# Patient Record
Sex: FEMALE | Race: BLACK OR AFRICAN AMERICAN | NOT HISPANIC OR LATINO | URBAN - METROPOLITAN AREA
[De-identification: names, ages, dates, MRNs, and addresses within clinical notes are randomized per-mention and may not be internally consistent; named-entity substitution may affect disease eponyms.]

---

## 2019-01-21 ENCOUNTER — OFFICE (OUTPATIENT)
Dept: URBAN - METROPOLITAN AREA CLINIC 11 | Facility: CLINIC | Age: 74
End: 2019-01-21

## 2019-01-21 VITALS
HEART RATE: 63 BPM | DIASTOLIC BLOOD PRESSURE: 93 MMHG | SYSTOLIC BLOOD PRESSURE: 174 MMHG | WEIGHT: 243 LBS | HEIGHT: 65 IN

## 2019-01-21 DIAGNOSIS — R19.4 CHANGE IN BOWEL HABIT: ICD-10-CM

## 2019-01-21 DIAGNOSIS — R15.1 FECAL SMEARING: ICD-10-CM

## 2019-01-21 PROCEDURE — 99203 OFFICE O/P NEW LOW 30 MIN: CPT | Performed by: INTERNAL MEDICINE

## 2019-01-21 RX ORDER — DICYCLOMINE HYDROCHLORIDE 20 MG/1
TABLET ORAL
Qty: 60 | Refills: 6 | Status: COMPLETED
Start: 2019-01-21 | End: 2019-02-18

## 2019-01-21 NOTE — SERVICENOTES
Will try dicyclomine twice daily due to improve the consistency of the stool.  If not then we will add Metamucil capsules 2-4 daily.

## 2019-01-21 NOTE — SERVICEHPINOTES
73-year-old  female well known to me and is an RN working as a school nurse currently.  She has a history of gallbladder surgery a least 10 years ago and has some difficulty with bowel function.  She does not really have diarrhea but has stools with poor consistency and some sticky and orally stool.  She has seepage that bothers her but no urgency and no real diarrhea.  She is lactose intolerant and avoids that in her diet.  She has been taking dicyclomine 20 milligrams once a day only.  Her last colonoscopy was in 2013 with diverticulosis only.  She has had a mild weight loss.  There has been no bleeding and no melena.

## 2023-09-11 ENCOUNTER — OFFICE (OUTPATIENT)
Dept: URBAN - METROPOLITAN AREA CLINIC 11 | Facility: CLINIC | Age: 78
End: 2023-09-11
Payer: COMMERCIAL

## 2023-09-11 VITALS
DIASTOLIC BLOOD PRESSURE: 73 MMHG | OXYGEN SATURATION: 98 % | HEIGHT: 64 IN | WEIGHT: 228 LBS | SYSTOLIC BLOOD PRESSURE: 155 MMHG | HEART RATE: 53 BPM

## 2023-09-11 DIAGNOSIS — I48.0 PAROXYSMAL ATRIAL FIBRILLATION: ICD-10-CM

## 2023-09-11 DIAGNOSIS — Z79.01 LONG TERM (CURRENT) USE OF ANTICOAGULANTS: ICD-10-CM

## 2023-09-11 DIAGNOSIS — R15.1 FECAL SMEARING: ICD-10-CM

## 2023-09-11 DIAGNOSIS — R19.4 CHANGE IN BOWEL HABIT: ICD-10-CM

## 2023-09-11 DIAGNOSIS — K57.90 DIVERTICULOSIS OF INTESTINE, PART UNSPECIFIED, WITHOUT PERFO: ICD-10-CM

## 2023-09-11 PROCEDURE — 99204 OFFICE O/P NEW MOD 45 MIN: CPT | Performed by: INTERNAL MEDICINE

## 2023-09-11 RX ORDER — SODIUM PICOSULFATE, MAGNESIUM OXIDE, AND ANHYDROUS CITRIC ACID 10; 3.5; 12 MG/160ML; G/160ML; G/160ML
LIQUID ORAL
Qty: 350 | Refills: 0 | Status: COMPLETED
Start: 2023-09-11 | End: 2023-10-30

## 2023-10-30 ENCOUNTER — AMBULATORY SURGICAL CENTER (OUTPATIENT)
Dept: URBAN - METROPOLITAN AREA SURGERY 3 | Facility: SURGERY | Age: 78
End: 2023-10-30
Payer: MEDICARE

## 2023-10-30 VITALS
HEIGHT: 64 IN | RESPIRATION RATE: 20 BRPM | HEIGHT: 64 IN | TEMPERATURE: 98 F | OXYGEN SATURATION: 98 % | HEART RATE: 54 BPM | SYSTOLIC BLOOD PRESSURE: 152 MMHG | DIASTOLIC BLOOD PRESSURE: 73 MMHG | SYSTOLIC BLOOD PRESSURE: 152 MMHG | SYSTOLIC BLOOD PRESSURE: 142 MMHG | SYSTOLIC BLOOD PRESSURE: 142 MMHG | OXYGEN SATURATION: 99 % | DIASTOLIC BLOOD PRESSURE: 69 MMHG | OXYGEN SATURATION: 100 % | HEART RATE: 48 BPM | HEART RATE: 54 BPM | DIASTOLIC BLOOD PRESSURE: 73 MMHG | DIASTOLIC BLOOD PRESSURE: 68 MMHG | HEART RATE: 48 BPM | OXYGEN SATURATION: 99 % | DIASTOLIC BLOOD PRESSURE: 68 MMHG | DIASTOLIC BLOOD PRESSURE: 76 MMHG | HEART RATE: 52 BPM | SYSTOLIC BLOOD PRESSURE: 136 MMHG | SYSTOLIC BLOOD PRESSURE: 142 MMHG | WEIGHT: 240 LBS | RESPIRATION RATE: 16 BRPM | DIASTOLIC BLOOD PRESSURE: 69 MMHG | HEART RATE: 48 BPM | DIASTOLIC BLOOD PRESSURE: 72 MMHG | TEMPERATURE: 98 F | HEART RATE: 52 BPM | WEIGHT: 240 LBS | OXYGEN SATURATION: 97 % | SYSTOLIC BLOOD PRESSURE: 143 MMHG | OXYGEN SATURATION: 98 % | DIASTOLIC BLOOD PRESSURE: 69 MMHG | OXYGEN SATURATION: 98 % | DIASTOLIC BLOOD PRESSURE: 73 MMHG | DIASTOLIC BLOOD PRESSURE: 72 MMHG | OXYGEN SATURATION: 100 % | SYSTOLIC BLOOD PRESSURE: 143 MMHG | OXYGEN SATURATION: 97 % | DIASTOLIC BLOOD PRESSURE: 76 MMHG | DIASTOLIC BLOOD PRESSURE: 76 MMHG | RESPIRATION RATE: 16 BRPM | HEART RATE: 50 BPM | HEART RATE: 50 BPM | OXYGEN SATURATION: 99 % | RESPIRATION RATE: 20 BRPM | TEMPERATURE: 97.5 F | SYSTOLIC BLOOD PRESSURE: 136 MMHG | OXYGEN SATURATION: 97 % | SYSTOLIC BLOOD PRESSURE: 143 MMHG | DIASTOLIC BLOOD PRESSURE: 72 MMHG | TEMPERATURE: 97.5 F | HEIGHT: 64 IN | SYSTOLIC BLOOD PRESSURE: 136 MMHG | HEART RATE: 50 BPM | RESPIRATION RATE: 20 BRPM | HEART RATE: 52 BPM | OXYGEN SATURATION: 100 % | HEART RATE: 54 BPM | DIASTOLIC BLOOD PRESSURE: 68 MMHG | TEMPERATURE: 98 F | TEMPERATURE: 97.5 F | WEIGHT: 240 LBS | SYSTOLIC BLOOD PRESSURE: 152 MMHG | RESPIRATION RATE: 16 BRPM

## 2023-10-30 DIAGNOSIS — Z12.11 ENCOUNTER FOR SCREENING FOR MALIGNANT NEOPLASM OF COLON: ICD-10-CM

## 2023-10-30 DIAGNOSIS — K57.30 DIVERTICULOSIS OF LARGE INTESTINE WITHOUT PERFORATION OR ABS: ICD-10-CM

## 2023-10-30 PROCEDURE — G0121 COLON CA SCRN NOT HI RSK IND: HCPCS | Performed by: INTERNAL MEDICINE

## 2023-10-30 NOTE — SERVICEHPINOTES
78-year-old  female well known to us from the past and is retired nurse. She is due for her 10 year interval screening colonoscopy. She had diverticulosis only on her last study. More recently she had problems with atrial fib and had a cardioversion last year but went back into AFib and had a cardioversion in June of this year 3 months ago. She is on Xarelto and sotalol. She has no chest pain or shortness of breath. She is followed by Dr. Quesada.  bowel function is okay but she is having more problems with fecal smearing or fecal incontinence. This is mild but seemingly a little worse than a few years ago when I saw her. She has to be very careful about eating. She does not have diarrhea and has no bleeding. There is no weight loss.